# Patient Record
Sex: MALE | Race: BLACK OR AFRICAN AMERICAN | NOT HISPANIC OR LATINO | URBAN - METROPOLITAN AREA
[De-identification: names, ages, dates, MRNs, and addresses within clinical notes are randomized per-mention and may not be internally consistent; named-entity substitution may affect disease eponyms.]

---

## 2024-01-10 ENCOUNTER — TELEPHONE (OUTPATIENT)
Dept: DENTISTRY | Facility: CLINIC | Age: 19
End: 2024-01-10

## 2024-01-10 ENCOUNTER — HOSPITAL ENCOUNTER (OUTPATIENT)
Facility: HOSPITAL | Age: 19
Setting detail: OUTPATIENT SURGERY
End: 2024-01-10
Attending: DENTIST | Admitting: DENTIST
Payer: COMMERCIAL

## 2024-01-10 ENCOUNTER — PREP FOR PROCEDURE (OUTPATIENT)
Dept: DENTISTRY | Facility: CLINIC | Age: 19
End: 2024-01-10

## 2024-01-10 DIAGNOSIS — K02.9 DENTAL CARIES: Primary | ICD-10-CM

## 2024-01-10 NOTE — TELEPHONE ENCOUNTER
OR CONFIRMATION NOTE     Called and spoke to mom to confirm dental appointment under general sedation in the OR on 02/09/2024. Reviewed medical hx - no changes. Denied cough/cold/congestion. Denied facial swelling, pain that is affecting the pt's ability to eat/drink/sleep and/or hx of fever. Reviewed tentative tx plan. Told mom to expect a call the day before the pt's procedure for NPO instructions and arrival time. All questions/concerns addressed.   21-Sep-2022 18:23

## 2024-02-05 ENCOUNTER — TELEPHONE (OUTPATIENT)
Dept: DENTISTRY | Facility: CLINIC | Age: 19
End: 2024-02-05

## 2024-02-05 NOTE — TELEPHONE ENCOUNTER
Received message:  # [phone]   PT has an OR appt on 2/9/24.Last week was diagnosed with the flu.     Spoke to mom, she states pt developed flu symptoms about a month ago, was diagnosed with the flu. She reports his symptoms have not yet resolved and she would like to reschedule the surgery. Agreed with mom, let her know someone will reach out to reschedule.    Inquired as to status of mom's guardianship ppw, since previous visits were cancelled due to lack of documentation. Mom states she has the ppw now; provided mom with secure office email to send the ppw. Mom will send tomorrow.    Chantal Thomas, DMD

## 2024-11-07 ENCOUNTER — TELEPHONE (OUTPATIENT)
Dept: DENTISTRY | Facility: CLINIC | Age: 19
End: 2024-11-07

## 2024-11-07 NOTE — TELEPHONE ENCOUNTER
Received CRM message patient parent inquire about dental surgery ; returned call per IVR unable to leave message-TW